# Patient Record
Sex: MALE | URBAN - METROPOLITAN AREA
[De-identification: names, ages, dates, MRNs, and addresses within clinical notes are randomized per-mention and may not be internally consistent; named-entity substitution may affect disease eponyms.]

---

## 2021-10-04 ENCOUNTER — SKIN CANCER EXAM (OUTPATIENT)
Dept: URBAN - METROPOLITAN AREA CLINIC 31 | Facility: CLINIC | Age: 31
Setting detail: DERMATOLOGY
End: 2021-10-04

## 2021-10-04 ENCOUNTER — RX ONLY (RX ONLY)
Age: 31
End: 2021-10-04

## 2021-10-04 DIAGNOSIS — L82.0 INFLAMED SEBORRHEIC KERATOSIS: ICD-10-CM

## 2021-10-04 PROCEDURE — 99204 OFFICE O/P NEW MOD 45 MIN: CPT

## 2021-10-04 RX ORDER — FINASTERIDE 1 MG/1
1 TABLET TABLET, FILM COATED ORAL ONCE A DAY
Qty: 90 | Refills: 3
Start: 2021-10-04

## 2022-07-13 ENCOUNTER — WORRISOME GROWTH - SEE NOTE (OUTPATIENT)
Dept: URBAN - METROPOLITAN AREA CLINIC 31 | Facility: CLINIC | Age: 32
Setting detail: DERMATOLOGY
End: 2022-07-13

## 2022-07-13 DIAGNOSIS — D22.5 MELANOCYTIC NEVI OF TRUNK: ICD-10-CM

## 2022-07-13 PROBLEM — L72.0 EPIDERMAL CYST: Status: RESOLVED | Noted: 2022-07-13

## 2022-07-13 PROBLEM — L72.0 EPIDERMAL CYST: Status: ACTIVE | Noted: 2022-07-13

## 2022-07-13 PROCEDURE — 10040 ACNE SURGERY: CPT

## 2022-07-28 ENCOUNTER — RASH (OUTPATIENT)
Dept: URBAN - METROPOLITAN AREA CLINIC 31 | Facility: CLINIC | Age: 32
Setting detail: DERMATOLOGY
End: 2022-07-28

## 2022-07-28 ENCOUNTER — RX ONLY (RX ONLY)
Age: 32
End: 2022-07-28

## 2022-07-28 DIAGNOSIS — D48.5 NEOPLASM OF UNCERTAIN BEHAVIOR OF SKIN: ICD-10-CM

## 2022-07-28 PROCEDURE — 99214 OFFICE O/P EST MOD 30 MIN: CPT

## 2022-07-28 RX ORDER — KETOCONAZOLE 20 MG/G
1 A SMALL AMOUNT CREAM TOPICAL TWICE A DAY
Qty: 60 | Refills: 3
Start: 2022-07-28

## 2022-10-07 ENCOUNTER — RX ONLY (RX ONLY)
Age: 32
End: 2022-10-07

## 2022-10-07 RX ORDER — FINASTERIDE 1 MG/1
TABLET, FILM COATED ORAL
Qty: 30 | Refills: 0 | Status: ERX | COMMUNITY
Start: 2022-10-07

## 2022-11-10 ENCOUNTER — RX ONLY (RX ONLY)
Age: 32
End: 2022-11-10

## 2022-11-10 RX ORDER — FINASTERIDE 1 MG/1
TABLET, FILM COATED ORAL QD
Qty: 90 | Refills: 2 | Status: ERX

## 2023-08-22 ENCOUNTER — APPOINTMENT (OUTPATIENT)
Dept: URBAN - METROPOLITAN AREA CLINIC 206 | Age: 33
Setting detail: DERMATOLOGY
End: 2023-08-28

## 2023-08-22 DIAGNOSIS — L64.8 OTHER ANDROGENIC ALOPECIA: ICD-10-CM

## 2023-08-22 PROCEDURE — OTHER PRESCRIPTION: OTHER

## 2023-08-22 PROCEDURE — OTHER COUNSELING: OTHER

## 2023-08-22 PROCEDURE — OTHER PRESCRIPTION MEDICATION MANAGEMENT: OTHER

## 2023-08-22 PROCEDURE — OTHER MIPS QUALITY: OTHER

## 2023-08-22 PROCEDURE — 99214 OFFICE O/P EST MOD 30 MIN: CPT

## 2023-08-22 PROCEDURE — OTHER ADDITIONAL NOTES: OTHER

## 2023-08-22 RX ORDER — MINOXIDIL 2.5 MG/1
TABLET ORAL
Qty: 90 | Refills: 3 | Status: ERX | COMMUNITY
Start: 2023-08-22

## 2023-08-22 RX ORDER — FINASTERIDE 1 MG/1
TABLET, FILM COATED ORAL
Qty: 90 | Refills: 0 | Status: ERX

## 2023-08-22 ASSESSMENT — LOCATION ZONE DERM: LOCATION ZONE: SCALP

## 2023-08-22 ASSESSMENT — LOCATION DETAILED DESCRIPTION DERM: LOCATION DETAILED: POSTERIOR MID-PARIETAL SCALP

## 2023-08-22 ASSESSMENT — LOCATION SIMPLE DESCRIPTION DERM: LOCATION SIMPLE: POSTERIOR SCALP

## 2023-08-22 NOTE — PROCEDURE: PRESCRIPTION MEDICATION MANAGEMENT
Detail Level: Zone
Continue Regimen: Finasteride 1 mg tablet - Take 1 tablet by mouth once daily\\n*Follow up in three months, will most likely discontinue finasteride and continue only oral minoxidil 2.5mg since patient has noticed more shedding since starting finasteride\\nOTC topical rogaine
Render In Strict Bullet Format?: No
Initiate Treatment: Minoxidil 2.5 mg tablet - Take 1 tablet by mouth once a day

## 2023-08-22 NOTE — PROCEDURE: ADDITIONAL NOTES
Detail Level: Simple
Render Risk Assessment In Note?: no
Additional Notes: -Patient's father had significant hair loss by mid-30s. Pt's mother's side does not have age-related hair loss. \\n-Pt has noticed increased shedding since starting finasteride 2 years ago.\\n-Pt has not had any finasteride-related side effects\\n-Discussed possibility of increased shedding when first starting minoxidil\\n-Pt's BP runs 120-130/80\\n-Discussed possible SE of oral minoxidil including increased hair growth, water retention, weight gain, tachycardia

## 2023-11-27 ENCOUNTER — RX ONLY (RX ONLY)
Age: 33
End: 2023-11-27

## 2023-11-27 RX ORDER — FINASTERIDE 1 MG/1
TABLET, FILM COATED ORAL
Qty: 90 | Refills: 3 | Status: ERX

## 2023-12-11 ENCOUNTER — APPOINTMENT (OUTPATIENT)
Dept: URBAN - METROPOLITAN AREA CLINIC 206 | Age: 33
Setting detail: DERMATOLOGY
End: 2023-12-18

## 2023-12-11 DIAGNOSIS — L64.8 OTHER ANDROGENIC ALOPECIA: ICD-10-CM

## 2023-12-11 PROCEDURE — OTHER MIPS QUALITY: OTHER

## 2023-12-11 PROCEDURE — OTHER COUNSELING: OTHER

## 2023-12-11 PROCEDURE — OTHER PRESCRIPTION MEDICATION MANAGEMENT: OTHER

## 2023-12-11 PROCEDURE — OTHER PRESCRIPTION: OTHER

## 2023-12-11 PROCEDURE — 99214 OFFICE O/P EST MOD 30 MIN: CPT

## 2023-12-11 RX ORDER — MINOXIDIL 2.5 MG/1
TABLET ORAL
Qty: 180 | Refills: 3 | Status: ERX

## 2023-12-11 RX ORDER — FINASTERIDE 1 MG/1
TABLET, FILM COATED ORAL
Qty: 90 | Refills: 3 | Status: ERX

## 2023-12-11 ASSESSMENT — LOCATION SIMPLE DESCRIPTION DERM: LOCATION SIMPLE: SCALP

## 2023-12-11 ASSESSMENT — LOCATION ZONE DERM: LOCATION ZONE: SCALP

## 2023-12-11 ASSESSMENT — LOCATION DETAILED DESCRIPTION DERM: LOCATION DETAILED: LEFT SUPERIOR PARIETAL SCALP

## 2023-12-11 NOTE — HPI: ANDROGENIC ALOPECIA
Is This A New Presentation, Or A Follow-Up?: Follow Up Androgenic Alopecia
Additional History: Has noticed some growth. Pt taking minoxidil and Finasteride.

## 2023-12-11 NOTE — PROCEDURE: PRESCRIPTION MEDICATION MANAGEMENT
Detail Level: Zone
Render In Strict Bullet Format?: Yes
Continue Regimen: -Finasteride daily
Modify Regimen: -Minoxidil 2.5mg take 2 pills a day

## 2024-11-06 ENCOUNTER — OFFICE VISIT (OUTPATIENT)
Dept: URBAN - METROPOLITAN AREA TELEHEALTH 2 | Facility: TELEHEALTH | Age: 34
End: 2024-11-06
Payer: COMMERCIAL

## 2024-11-06 ENCOUNTER — TELEPHONE ENCOUNTER (OUTPATIENT)
Dept: URBAN - METROPOLITAN AREA CLINIC 80 | Facility: CLINIC | Age: 34
End: 2024-11-06

## 2024-11-06 DIAGNOSIS — K62.5 PAINLESS RECTAL BLEEDING: ICD-10-CM

## 2024-11-06 PROCEDURE — 99204 OFFICE O/P NEW MOD 45 MIN: CPT | Performed by: STUDENT IN AN ORGANIZED HEALTH CARE EDUCATION/TRAINING PROGRAM

## 2024-11-06 NOTE — HPI-TODAY'S VISIT:
Mr. Derik Chacon (good friend of mine) is a healthy 34yoM who presents to discuss retectal bleeding. He noted that about 5-7 months ago, was taking a colon cleansing pill. After this saw some streaks of BRB in the poop. This resolved with stopping the cleaning medication. A few weeks ago saw some BRB on toilet paper with wiping. Slowly more persistently seening some red stripes on the outside of the stool and on the toilet paper. He recalls in 2014, had seen drops of blood in toilet with defecation. Had a colonoscopy at that time and told everything was fine. This was with Dr. Gale at [facility]. Does not recall any follow-up.   Has1-2 BM per day - more loose recently attributed to diet. No straining. Denies any unintentional weight loss. No family history of IBD. No joint issues, eye redness/pain, rashes, or oral uclers. No family history of colon cancer. Believes his father had colon polyps. No GI cancers or celiac disease family.

## 2024-11-11 ENCOUNTER — TELEPHONE ENCOUNTER (OUTPATIENT)
Dept: URBAN - METROPOLITAN AREA CLINIC 80 | Facility: CLINIC | Age: 34
End: 2024-11-11

## 2024-11-18 ENCOUNTER — TELEPHONE ENCOUNTER (OUTPATIENT)
Dept: URBAN - METROPOLITAN AREA CLINIC 80 | Facility: CLINIC | Age: 34
End: 2024-11-18

## 2024-11-19 ENCOUNTER — CLAIMS CREATED FROM THE CLAIM WINDOW (OUTPATIENT)
Dept: URBAN - METROPOLITAN AREA SURGERY CENTER 19 | Facility: SURGERY CENTER | Age: 34
End: 2024-11-19
Payer: COMMERCIAL

## 2024-11-19 ENCOUNTER — TELEPHONE ENCOUNTER (OUTPATIENT)
Dept: URBAN - METROPOLITAN AREA CLINIC 80 | Facility: CLINIC | Age: 34
End: 2024-11-19

## 2024-11-19 ENCOUNTER — OFFICE VISIT (OUTPATIENT)
Dept: URBAN - METROPOLITAN AREA SURGERY CENTER 19 | Facility: SURGERY CENTER | Age: 34
End: 2024-11-19

## 2024-11-19 DIAGNOSIS — K51.211 ULCERATIVE PROCTITIS WITH RECTAL BLEEDING: ICD-10-CM

## 2024-11-19 PROBLEM — 10811000202109: Status: ACTIVE | Noted: 2024-11-19

## 2024-11-19 PROCEDURE — 00811 ANES LWR INTST NDSC NOS: CPT | Performed by: NURSE ANESTHETIST, CERTIFIED REGISTERED

## 2024-11-19 RX ORDER — MESALAMINE 1000 MG/1
1 SUPPOSITORY AT BEDTIME SUPPOSITORY RECTAL ONCE A DAY
Qty: 30 | Refills: 11 | OUTPATIENT
Start: 2024-11-19 | End: 2025-11-14

## 2024-11-21 ENCOUNTER — OFFICE VISIT (OUTPATIENT)
Dept: URBAN - METROPOLITAN AREA TELEHEALTH 2 | Facility: TELEHEALTH | Age: 34
End: 2024-11-21

## 2024-11-22 ENCOUNTER — LAB OUTSIDE AN ENCOUNTER (OUTPATIENT)
Dept: URBAN - METROPOLITAN AREA CLINIC 80 | Facility: CLINIC | Age: 34
End: 2024-11-22

## 2024-11-23 LAB
ALBUMIN: 4.9
ALKALINE PHOSPHATASE: 91
ALT (SGPT): 24
AST (SGOT): 21
BASO (ABSOLUTE): 0.1
BASOS: 1
BILIRUBIN, TOTAL: 0.7
BUN/CREATININE RATIO: 10
BUN: 10
C-REACTIVE PROTEIN, QUANT: 2
CALCIUM: 10.1
CARBON DIOXIDE, TOTAL: 27
CHLORIDE: 103
CREATININE: 0.99
EGFR: 102
EOS (ABSOLUTE): 0.4
EOS: 8
GLOBULIN, TOTAL: 2.3
GLUCOSE: 82
HEMATOCRIT: 45.3
HEMATOLOGY COMMENTS:: (no result)
HEMOGLOBIN: 15.2
IMMATURE CELLS: (no result)
IMMATURE GRANS (ABS): 0
IMMATURE GRANULOCYTES: 0
LYMPHS (ABSOLUTE): 1.6
LYMPHS: 33
MCH: 31.5
MCHC: 33.6
MCV: 94
MONOCYTES(ABSOLUTE): 0.4
MONOCYTES: 9
NEUTROPHILS (ABSOLUTE): 2.3
NEUTROPHILS: 49
NRBC: (no result)
PLATELETS: 356
POTASSIUM: 4.7
PROTEIN, TOTAL: 7.2
RBC: 4.83
RDW: 12.2
SODIUM: 143
WBC: 4.6

## 2024-11-25 ENCOUNTER — TELEPHONE ENCOUNTER (OUTPATIENT)
Dept: URBAN - METROPOLITAN AREA CLINIC 80 | Facility: CLINIC | Age: 34
End: 2024-11-25

## 2024-11-25 RX ORDER — MESALAMINE 1.2 G/1
4 TABLETS WITH MEAL TABLET, DELAYED RELEASE ORAL ONCE A DAY
Qty: 360 TABLET | Refills: 3 | OUTPATIENT
Start: 2024-11-25 | End: 2025-11-20

## 2024-12-03 ENCOUNTER — OFFICE VISIT (OUTPATIENT)
Dept: URBAN - METROPOLITAN AREA SURGERY CENTER 19 | Facility: SURGERY CENTER | Age: 34
End: 2024-12-03

## 2024-12-09 ENCOUNTER — OFFICE VISIT (OUTPATIENT)
Dept: URBAN - METROPOLITAN AREA TELEHEALTH 2 | Facility: TELEHEALTH | Age: 34
End: 2024-12-09

## 2025-01-23 ENCOUNTER — APPOINTMENT (OUTPATIENT)
Dept: URBAN - METROPOLITAN AREA CLINIC 206 | Age: 35
Setting detail: DERMATOLOGY
End: 2025-01-24

## 2025-01-23 DIAGNOSIS — L64.8 OTHER ANDROGENIC ALOPECIA: ICD-10-CM

## 2025-01-23 PROCEDURE — OTHER COUNSELING: OTHER

## 2025-01-23 PROCEDURE — OTHER MIPS QUALITY: OTHER

## 2025-01-23 PROCEDURE — OTHER PRESCRIPTION: OTHER

## 2025-01-23 PROCEDURE — OTHER PRESCRIPTION MEDICATION MANAGEMENT: OTHER

## 2025-01-23 PROCEDURE — 99214 OFFICE O/P EST MOD 30 MIN: CPT

## 2025-01-23 RX ORDER — FINASTERIDE 1 MG/1
TABLET, FILM COATED ORAL
Qty: 90 | Refills: 3 | Status: ERX

## 2025-01-23 RX ORDER — MINOXIDIL 2.5 MG/1
TABLET ORAL QD
Qty: 90 | Refills: 3 | Status: ERX

## 2025-01-23 ASSESSMENT — LOCATION ZONE DERM: LOCATION ZONE: SCALP

## 2025-01-23 ASSESSMENT — LOCATION SIMPLE DESCRIPTION DERM: LOCATION SIMPLE: SCALP

## 2025-01-23 ASSESSMENT — LOCATION DETAILED DESCRIPTION DERM
LOCATION DETAILED: LEFT SUPERIOR PARIETAL SCALP
LOCATION DETAILED: LEFT CENTRAL OCCIPITAL SCALP

## 2025-01-23 NOTE — PROCEDURE: PRESCRIPTION MEDICATION MANAGEMENT
Detail Level: Zone
Render In Strict Bullet Format?: Yes
Continue Regimen: -Finasteride daily\\n\\n-Minoxidil 2.5mg take 2 pills a day

## 2025-01-23 NOTE — HPI: ANDROGENETIC ALOPECIA
Is This A New Presentation, Or A Follow-Up?: Follow Up Androgenetic Alopecia
How Did The Hair Loss Occur?: gradual in onset
How Severe Is Your Hair Loss?: moderate
Previous Labs: No

## 2025-03-19 ENCOUNTER — RX ONLY (RX ONLY)
Age: 35
End: 2025-03-19

## 2025-03-19 RX ORDER — MINOXIDIL 2.5 MG/1
TABLET ORAL
Qty: 180 | Refills: 3 | Status: ERX

## 2025-03-28 ENCOUNTER — APPOINTMENT (OUTPATIENT)
Dept: URBAN - METROPOLITAN AREA CLINIC 206 | Age: 35
Setting detail: DERMATOLOGY
End: 2025-03-30

## 2025-03-28 DIAGNOSIS — H01.13 ECZEMATOUS DERMATITIS OF EYELID: ICD-10-CM

## 2025-03-28 PROBLEM — H01.134 ECZEMATOUS DERMATITIS OF LEFT UPPER EYELID: Status: ACTIVE | Noted: 2025-03-28

## 2025-03-28 PROBLEM — H01.131 ECZEMATOUS DERMATITIS OF RIGHT UPPER EYELID: Status: ACTIVE | Noted: 2025-03-28

## 2025-03-28 PROCEDURE — 99213 OFFICE O/P EST LOW 20 MIN: CPT

## 2025-03-28 PROCEDURE — OTHER COUNSELING: OTHER

## 2025-03-28 PROCEDURE — OTHER PRESCRIPTION: OTHER

## 2025-03-28 PROCEDURE — OTHER PRESCRIPTION MEDICATION MANAGEMENT: OTHER

## 2025-03-28 PROCEDURE — OTHER MIPS QUALITY: OTHER

## 2025-03-28 RX ORDER — HYDROCORTISONE 25 MG/G
OINTMENT TOPICAL
Qty: 20 | Refills: 1 | Status: ERX | COMMUNITY
Start: 2025-03-28

## 2025-03-28 ASSESSMENT — LOCATION DETAILED DESCRIPTION DERM
LOCATION DETAILED: LEFT MEDIAL SUPERIOR EYELID
LOCATION DETAILED: RIGHT LATERAL SUPERIOR EYELID

## 2025-03-28 ASSESSMENT — SEVERITY ASSESSMENT: SEVERITY: MILD

## 2025-03-28 ASSESSMENT — LOCATION SIMPLE DESCRIPTION DERM
LOCATION SIMPLE: LEFT SUPERIOR EYELID
LOCATION SIMPLE: RIGHT SUPERIOR EYELID

## 2025-03-28 ASSESSMENT — LOCATION ZONE DERM: LOCATION ZONE: EYELID

## 2025-03-28 NOTE — PROCEDURE: PRESCRIPTION MEDICATION MANAGEMENT
Detail Level: Zone
Render In Strict Bullet Format?: Yes
Initiate Treatment: -Hydrocortisone 2.5% ointment apply to eyelids twice a day for 2 weeks, take a 2 week break and restart if needed\\n-use Vaseline throughout the day\\n-Zrytec 10mg take 1 pill once a day at night time to help with itch if needed

## 2025-03-28 NOTE — HPI: RASH
How Severe Is Your Rash?: mild
Is This A New Presentation, Or A Follow-Up?: Rash
Additional History: States has been occurring intermittently for the past 6 weeks. Pt has been using Vaseline OTC